# Patient Record
Sex: FEMALE | Race: BLACK OR AFRICAN AMERICAN | NOT HISPANIC OR LATINO | Employment: UNEMPLOYED | URBAN - METROPOLITAN AREA
[De-identification: names, ages, dates, MRNs, and addresses within clinical notes are randomized per-mention and may not be internally consistent; named-entity substitution may affect disease eponyms.]

---

## 2017-05-16 ENCOUNTER — TELEPHONE (OUTPATIENT)
Dept: INTERNAL MEDICINE | Facility: CLINIC | Age: 37
End: 2017-05-16

## 2017-05-17 NOTE — TELEPHONE ENCOUNTER
----- Message from Denisse Robert sent at 5/16/2017  1:20 PM CDT -----  Contact: [phone]/ self   Patient is itching all over and wants to know if something can be called in. It started last night and she hasn't been able to sleep. She has no hives or bumps. Please advise.

## 2018-05-18 ENCOUNTER — OFFICE VISIT (OUTPATIENT)
Dept: URGENT CARE | Facility: CLINIC | Age: 38
End: 2018-05-18
Payer: MEDICAID

## 2018-05-18 VITALS
SYSTOLIC BLOOD PRESSURE: 176 MMHG | BODY MASS INDEX: 32.18 KG/M2 | HEART RATE: 98 BPM | WEIGHT: 205 LBS | OXYGEN SATURATION: 99 % | HEIGHT: 67 IN | DIASTOLIC BLOOD PRESSURE: 98 MMHG | TEMPERATURE: 98 F | RESPIRATION RATE: 14 BRPM

## 2018-05-18 DIAGNOSIS — S05.02XA ABRASION OF LEFT CORNEA, INITIAL ENCOUNTER: ICD-10-CM

## 2018-05-18 DIAGNOSIS — H11.422: ICD-10-CM

## 2018-05-18 DIAGNOSIS — H11.32 SUBCONJUNCTIVAL BLEED, LEFT: Primary | ICD-10-CM

## 2018-05-18 PROCEDURE — 99203 OFFICE O/P NEW LOW 30 MIN: CPT | Mod: S$GLB,,, | Performed by: NURSE PRACTITIONER

## 2018-05-18 RX ORDER — KETOROLAC TROMETHAMINE 4 MG/ML
1 SOLUTION/ DROPS OPHTHALMIC 4 TIMES DAILY
Qty: 5 ML | Refills: 0 | Status: SHIPPED | OUTPATIENT
Start: 2018-05-18 | End: 2018-05-18 | Stop reason: CLARIF

## 2018-05-18 RX ORDER — KETOROLAC TROMETHAMINE 5 MG/ML
1 SOLUTION OPHTHALMIC 4 TIMES DAILY
Qty: 3 ML | Refills: 0 | Status: SHIPPED | OUTPATIENT
Start: 2018-05-18 | End: 2018-05-18 | Stop reason: CLARIF

## 2018-05-18 RX ORDER — POLYMYXIN B SULFATE AND TRIMETHOPRIM 1; 10000 MG/ML; [USP'U]/ML
SOLUTION OPHTHALMIC
Qty: 1 BOTTLE | Refills: 0 | Status: SHIPPED | OUTPATIENT
Start: 2018-05-18 | End: 2018-05-21

## 2018-05-18 NOTE — PATIENT INSTRUCTIONS
Corneal Abrasion    You have received a scratch or scrape (abrasion) to your cornea. The cornea is the clear part in the front of the eye. This sensitive area is very painful when injured. You may make tears frequently, and your vision may be blurry until the injury heals. You may be sensitive to light.  This part of the body heals quickly. You can expect the pain to go away within 24 to 48 hours. If the abrasion is large or deep, your doctor may apply an eye patch, although this is not always done. An antibiotic ointment or eye drops may also be used to prevent infection.  Numbing drops may be used to relieve the pain temporarily so that your eyes can be examined. However, these drops cannot be prescribed for home use because that would prevent healing and lead to more serious problems. Also, if you cant feel your eye, there is a chance of accidentally injuring it further without knowing it.  Home care  · A cold pack (ice in a plastic bag, wrapped in a towel) may be applied over the eye (or eye patch) for 20 minutes at a time, to reduce pain.  · You may use acetaminophen or ibuprofen to control pain, unless another pain medicine was prescribed. Note: If you have chronic liver or kidney disease or ever had a stomach ulcer or GI bleeding, talk with your doctor before using these medicines.  · Rest your eyes and do not read until symptoms are gone.  · If you use contact lenses, do not wear them until all symptoms are gone.  · If your vision is affected by the corneal abrasion or if an eye patch was applied, do not drive a motor vehicle or operate machinery until all symptoms are gone. You may have trouble judging distances using only one eye.  · If your eyes are sensitive to light, try wearing sunglasses, or stay indoors until symptoms go away.  Follow-up care  Follow up with your health care provider, or as advised.  · If no patch was put on your eye, and used but the pain continues for more than 48 hours, you  should have another exam. Return to this facility or contact your health care provider to arrange this.  · If your eye was patched and you were asked to remove the patch yourself, see your health care provider. You may also return to this facility if you still have pain after the patch is removed.  · If you were given a return appointment for patch removal and re-examination, be sure to keep the appointment. Leaving the patch in place longer than advised could be harmful.  When to seek medical advice  Call your health care provider right away if any of these occur.  · Increasing eye pain or pain that does not improve after 24 hours  · Discharge from the eye  · Increasing redness of the eye or swelling of the eyelids  · Worsening vision  · Symptoms that worsen after the abrasion has healed  Date Last Reviewed: 6/14/2015  © 3108-3721 [employer]. [address] [place] [address]. All rights reserved. This information is not intended as a substitute for professional medical care. Always follow your healthcare professional's instructions.        Corneal Injury    An eye injury can hurt your cornea. Your cornea is the clear layer on the front of your eye. It protects your eye from dust and germs, and helps filter out harmful UV (ultraviolet) rays. The cornea also helps to focus light entering your eye. Your cornea is made of strong proteins, but it can be damaged. A slight cut or scratch (abrasion) to the cornea can be very painful. But that is often minor and can heal within 1 or 2 days. A bad abrasion or a hole (puncture) in the cornea can be very serious. These are medical emergencies.  Something in your eye  If you think you have something small in your eye, flush it with water right away. Pull your upper lid out and over your bottom lid. This will help increase the flow of tears across your eye. If these methods dont work, call your healthcare provider. Never try to remove an object from your  eye that doesnt flush out easily with water. Doing so may cause more damage.  When to go to the emergency room (ER)  Call 911 or your local emergency number if you have:  · Severe eye pain  · A puncture injury or bad abrasion  · Something in your eye that you cant flush out with water  · A very swollen or painful eye after removing an object  · A chemical burn  · An object embedded in your eye. (Cover both eyes with a sterile compress and keep both eyes closed while you wait for help. DO NOT put any pressure on your eyes.)  What to expect in the ER  For minor abrasions   Minor abrasions are usually treated with eye drops or ointment. You may be given antibiotics to prevent infection. Most abrasions heal in 1 or 2 days. To help rule out more serious injuries, you may have tests including:  · A standard eye exam to check how well you can see  · A Watson test, which uses a special dye to look for severe eye damage  Depending on the results of these tests, you may be referred to an eye specialist (ophthalmologist).  For serious abrasions or punctures  You will be referred directly to an ophthalmologist for emergency treatment. An eye specialist is needed to reduce further damage and possible vision loss.  Date Last Reviewed: 8/12/2015  © 4350-5656 [employer]. [address] [place] [address], [place] [address]. All rights reserved. This information is not intended as a substitute for professional medical care. Always follow your healthcare professional's instructions.        Subconjunctival Hemorrhage    A subconjunctival hemorrhage is a result of a broken blood vessel in the white portion of the eye. It is usually painless and may be caused by coughing, sneezing, or vomiting. An injury to the eye can cause this. It can also be a sign of hypertension (high blood pressure) or a bleeding disorder.  Although it can look frightening, the presence of the blood is not serious. The blood will be reabsorbed without  treatment within 2 to 3 weeks.  Home care  You may continue your usual activities.  Follow-up care  Follow up with your healthcare provider, or as advised.  When to seek medical advice  Contact your healthcare provider right away if any of these occur:  · Pain in the eye  · Change in vision  · The blood does not disappear within three weeks  · Increasing redness or swelling of the eye  · Severe headache or dizziness  · Signs of bruising or bleeding from other parts of your body  Date Last Reviewed: 6/14/2015  © 8197-1009 [employer]. [address] [place] [address]. All rights reserved. This information is not intended as a substitute for professional medical care. Always follow your healthcare professional's instructions.      -As we discussed today you need to start the eye drops to the affected eye 3 times a day for the next 3-4 days.  -I have given you an [facility] doctor referral. If you don't hear from them in a few days call [phone]  -I have put in a referral for Opthalmology you should be contacted today with a possible appointment.  -Try to see your Eye doctor today if your can.    -If your symptoms worsen and your start to experience worsening pain or worsening changes in vision go to the ER immediately.
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
## 2018-05-22 ENCOUNTER — TELEPHONE (OUTPATIENT)
Dept: URGENT CARE | Facility: CLINIC | Age: 38
End: 2018-05-22

## 2018-11-01 ENCOUNTER — OFFICE VISIT (OUTPATIENT)
Dept: URGENT CARE | Facility: CLINIC | Age: 38
End: 2018-11-01
Payer: MEDICAID

## 2018-11-01 VITALS
SYSTOLIC BLOOD PRESSURE: 148 MMHG | BODY MASS INDEX: 31.39 KG/M2 | DIASTOLIC BLOOD PRESSURE: 76 MMHG | RESPIRATION RATE: 18 BRPM | WEIGHT: 200 LBS | HEART RATE: 81 BPM | TEMPERATURE: 98 F | OXYGEN SATURATION: 98 % | HEIGHT: 67 IN

## 2018-11-01 DIAGNOSIS — R35.0 FREQUENCY OF URINATION: Primary | ICD-10-CM

## 2018-11-01 DIAGNOSIS — M54.9 BACK PAIN WITHOUT RADIATION: ICD-10-CM

## 2018-11-01 DIAGNOSIS — R39.15 URGENCY OF URINATION: ICD-10-CM

## 2018-11-01 LAB
B-HCG UR QL: NEGATIVE
BILIRUB UR QL STRIP: NEGATIVE
CTP QC/QA: YES
GLUCOSE UR QL STRIP: NEGATIVE
KETONES UR QL STRIP: NEGATIVE
LEUKOCYTE ESTERASE UR QL STRIP: NEGATIVE
PH, POC UA: 6 (ref 5–8)
POC BLOOD, URINE: NEGATIVE
POC NITRATES, URINE: NEGATIVE
PROT UR QL STRIP: NEGATIVE
SP GR UR STRIP: 1.02 (ref 1–1.03)
UROBILINOGEN UR STRIP-ACNC: NORMAL (ref 0.1–1.1)

## 2018-11-01 PROCEDURE — 81003 URINALYSIS AUTO W/O SCOPE: CPT | Mod: QW,S$GLB,, | Performed by: FAMILY MEDICINE

## 2018-11-01 PROCEDURE — 81025 URINE PREGNANCY TEST: CPT | Mod: S$GLB,,, | Performed by: FAMILY MEDICINE

## 2018-11-01 PROCEDURE — 99214 OFFICE O/P EST MOD 30 MIN: CPT | Mod: 25,S$GLB,, | Performed by: FAMILY MEDICINE

## 2018-11-01 RX ORDER — DICLOFENAC SODIUM 75 MG/1
75 TABLET, DELAYED RELEASE ORAL 2 TIMES DAILY WITH MEALS
Qty: 60 TABLET | Refills: 2 | Status: SHIPPED | OUTPATIENT
Start: 2018-11-01 | End: 2019-11-01

## 2018-11-01 RX ORDER — DEXTROAMPHETAMINE SACCHARATE, AMPHETAMINE ASPARTATE MONOHYDRATE, DEXTROAMPHETAMINE SULFATE AND AMPHETAMINE SULFATE 7.5; 7.5; 7.5; 7.5 MG/1; MG/1; MG/1; MG/1
30 CAPSULE, EXTENDED RELEASE ORAL EVERY MORNING
COMMUNITY

## 2018-11-01 RX ORDER — DEXAMETHASONE SODIUM PHOSPHATE 100 MG/10ML
10 INJECTION INTRAMUSCULAR; INTRAVENOUS
Status: COMPLETED | OUTPATIENT
Start: 2018-11-01 | End: 2018-11-01

## 2018-11-01 RX ORDER — NAPROXEN 500 MG/1
500 TABLET ORAL 2 TIMES DAILY WITH MEALS
Qty: 30 TABLET | Refills: 2 | Status: SHIPPED | OUTPATIENT
Start: 2018-11-01 | End: 2018-11-01 | Stop reason: SDUPTHER

## 2018-11-01 RX ADMIN — DEXAMETHASONE SODIUM PHOSPHATE 10 MG: 100 INJECTION INTRAMUSCULAR; INTRAVENOUS at 08:11

## 2018-11-02 NOTE — PROGRESS NOTES
"Subjective:       Patient ID: Ryanne Damian is a 38 y.o. female.    Vitals:  height is 5' 7" (1.702 m) and weight is 90.7 kg (200 lb). Her oral temperature is 98.4 °F (36.9 °C). Her blood pressure is 148/76 (abnormal) and her pulse is 81. Her respiration is 18 and oxygen saturation is 98%.     Chief Complaint: Back Pain (1.5 weeks)    37 y/o female with c/o LBP x 2 weeks, minimal radiation, also c/o some dysuria and hesitancy. No fever, no hx of LBP          Back Pain   This is a new problem. The current episode started in the past 7 days. The problem occurs constantly. The problem has been gradually worsening since onset. The quality of the pain is described as aching. The pain does not radiate. The pain is at a severity of 8/10. The pain is moderate. The pain is the same all the time. The symptoms are aggravated by bending, lying down, sitting, twisting, standing, position and urinating. Pertinent negatives include no abdominal pain, bladder incontinence, bowel incontinence, dysuria or numbness. She has tried nothing for the symptoms.     Review of Systems   Constitution: Negative for malaise/fatigue.   Skin: Negative for rash.   Musculoskeletal: Positive for back pain. Negative for muscle cramps, muscle weakness and stiffness.   Gastrointestinal: Negative for abdominal pain and bowel incontinence.   Genitourinary: Positive for frequency, hesitancy and urgency. Negative for bladder incontinence, dysuria and hematuria.   Neurological: Negative for disturbances in coordination and numbness.       Objective:      Physical Exam   Constitutional: She is oriented to person, place, and time. She appears well-developed and well-nourished. She is cooperative.  Non-toxic appearance. She does not appear ill. No distress.   HENT:   Head: Normocephalic and atraumatic.   Right Ear: Hearing, tympanic membrane, external ear and ear canal normal.   Left Ear: Hearing, tympanic membrane, external ear and ear canal normal. "   Nose: Nose normal. No mucosal edema, rhinorrhea or nasal deformity. No epistaxis. Right sinus exhibits no maxillary sinus tenderness and no frontal sinus tenderness. Left sinus exhibits no maxillary sinus tenderness and no frontal sinus tenderness.   Mouth/Throat: Uvula is midline, oropharynx is clear and moist and mucous membranes are normal. No trismus in the jaw. Normal dentition. No uvula swelling. No posterior oropharyngeal erythema.   Eyes: Conjunctivae and lids are normal. Right eye exhibits no discharge. Left eye exhibits no discharge.   Sclera clear bilat   Neck: Trachea normal, normal range of motion, full passive range of motion without pain and phonation normal. Neck supple. No JVD present. No tracheal deviation present.   Cardiovascular: Normal rate, regular rhythm, normal heart sounds, intact distal pulses and normal pulses. Exam reveals no friction rub.   No murmur heard.  Pulmonary/Chest: Effort normal and breath sounds normal. No stridor. She has no wheezes.   Abdominal: Soft. Normal appearance and bowel sounds are normal. She exhibits no distension, no pulsatile midline mass and no mass. There is no tenderness.   Musculoskeletal: Normal range of motion. She exhibits no edema or deformity.   BACK: minimal paraspinous tenderness  Flexion/extension normal  SLRE negative  No cva tenderness   Lymphadenopathy:     She has no cervical adenopathy.   Neurological: She is alert and oriented to person, place, and time. She exhibits normal muscle tone. Coordination normal.   Skin: Skin is warm, dry and intact. She is not diaphoretic. No pallor.   Psychiatric: She has a normal mood and affect. Her speech is normal and behavior is normal. Judgment and thought content normal. Cognition and memory are normal.   Nursing note and vitals reviewed.      Assessment:       1. Frequency of urination    2. Urgency of urination    3. Back pain without radiation        Plan:         Frequency of urination  -     POCT  urine pregnancy    Urgency of urination  -     POCT Urinalysis, Dipstick, Manual, W/O Scope    Back pain without radiation  -     naproxen (NAPROSYN) 500 MG tablet; Take 1 tablet (500 mg total) by mouth 2 (two) times daily with meals.  Dispense: 30 tablet; Refill: 2

## 2018-11-02 NOTE — PATIENT INSTRUCTIONS
Back Pain (Acute or Chronic)    Back pain is one of the most common problems. The good news is that most people feel better in 1 to 2 weeks, and most of the rest in 1 to 2 months. Most people can remain active.  People experience and describe pain differently; not everyone is the same.  · The pain can be sharp, stabbing, shooting, aching, cramping or burning.  · Movement, standing, bending, lifting, sitting, or walking may worsen pain.  · It can be localized to one spot or area, or it can be more generalized.  · It can spread or radiate upwards, to the front, or go down your arms or legs (sciatica).  · It can cause muscle spasm.  Most of the time, mechanical problems with the muscles or spine cause the pain. Mechanical problems are usually caused by an injury to the muscles or ligaments. While illness can cause back pain, it is usually not caused by a serious illness. Mechanical problems include:   · Physical activity such as sports, exercise, work, or normal activity  · Overexertion, lifting, pushing, pulling incorrectly or too aggressively  · Sudden twisting, bending, or stretching from an accident, or accidental movement  · Poor posture  · Stretching or moving wrong, without noticing pain at the time  · Poor coordination, lack of regular exercise (check with your doctor about this)  · Spinal disc disease or arthritis  · Stress  Pain can also be related to pregnancy, or illness like appendicitis, bladder or kidney infections, pelvic infections, and many other things.  Acute back pain usually gets better in 1 to 2 weeks. Back pain related to disk disease, arthritis in the spinal joints or spinal stenosis (narrowing of the spinal canal) can become chronic and last for months or years.  Unless you had a physical injury (for example, a car accident or fall) X-rays are usually not needed for the initial evaluation of back pain. If pain continues and does not respond to medical treatment, X-rays and other tests may be  needed.  Home care  Try these home care recommendations:  · When in bed, try to find a position of comfort. A firm mattress is best. Try lying flat on your back with pillows under your knees. You can also try lying on your side with your knees bent up towards your chest and a pillow between your knees.  · At first, do not try to stretch out the sore spots. If there is a strain, it is not like the good soreness you get after exercising without an injury. In this case, stretching may make it worse.  · Avoid prolong sitting, long car rides, or travel. This puts more stress on the lower back than standing or walking.  · During the first 24 to 72 hours after an acute injury or flare up of chronic back pain, apply an ice pack to the painful area for 20 minutes and then remove it for 20 minutes. Do this over a period of 60 to 90 minutes or several times a day. This will reduce swelling and pain. Wrap the ice pack in a thin towel or plastic to protect your skin.  · You can start with ice, then switch to heat. Heat (hot shower, hot bath, or heating pad) reduces pain and works well for muscle spasms. Heat can be applied to the painful area for 20 minutes then remove it for 20 minutes. Do this over a period of 60 to 90 minutes or several times a day. Do not sleep on a heating pad. It can lead to skin burns or tissue damage.  · You can alternate ice and heat therapy. Talk with your doctor about the best treatment for your back pain.  · Therapeutic massage can help relax the back muscles without stretching them.  · Be aware of safe lifting methods and do not lift anything without stretching first.  Medicines  Talk to your doctor before using medicine, especially if you have other medical problems or are taking other medicines.  · You may use over-the-counter medicine as directed on the bottle to control pain, unless another pain medicine was prescribed. If you have chronic conditions like diabetes, liver or kidney disease,  stomach ulcers, or gastrointestinal bleeding, or are taking blood thinners, talk to your doctor before taking any medicine.  · Be careful if you are given a prescription medicines, narcotics, or medicine for muscle spasms. They can cause drowsiness, affect your coordination, reflexes, and judgement. Do not drive or operate heavy machinery.  Follow-up care  Follow up with your healthcare provider, or as advised.   A radiologist will review any X-rays that were taken. Your provide will notify you of any new findings that may affect your care.  Call 911  Call emergency services if any of the following occur:  · Trouble breathing  · Confusion  · Very drowsy or trouble awakening  · Fainting or loss of consciousness  · Rapid or very slow heart rate  · Loss of bowel or bladder control  When to seek medical advice  Call your healthcare provider right away if any of these occur:   · Pain becomes worse or spreads to your legs  · Weakness or numbness in one or both legs  · Numbness in the groin or genital area  Date Last Reviewed: 7/1/2016  © 4757-1248 [employer], [employer]. [address] [place] [address], [place] [address]. All rights reserved. This information is not intended as a substitute for professional medical care. Always follow your healthcare professional's instructions.

## 2019-01-31 ENCOUNTER — TELEPHONE (OUTPATIENT)
Dept: FAMILY MEDICINE | Facility: HOSPITAL | Age: 39
End: 2019-01-31

## 2019-01-31 NOTE — TELEPHONE ENCOUNTER
Attempted to contact patient and discuss, but no answer. Left voicemail stating to call the office and ask to speak to the nurse Long.

## 2019-01-31 NOTE — TELEPHONE ENCOUNTER
----- Message from Perri Shah sent at 1/31/2019 12:49 PM CST -----  Patient asking if Dr can send her a RX for pain. Explained that she has not been seen by her and she needs to come in. Pt insisted I sent a message

## 2019-02-01 ENCOUNTER — HOSPITAL ENCOUNTER (EMERGENCY)
Facility: HOSPITAL | Age: 39
Discharge: HOME | End: 2019-02-01
Attending: EMERGENCY MEDICINE
Payer: MEDICAID

## 2019-02-01 VITALS
WEIGHT: 207 LBS | HEIGHT: 67 IN | TEMPERATURE: 98 F | HEART RATE: 107 BPM | RESPIRATION RATE: 16 BRPM | BODY MASS INDEX: 32.49 KG/M2 | SYSTOLIC BLOOD PRESSURE: 183 MMHG | DIASTOLIC BLOOD PRESSURE: 92 MMHG | OXYGEN SATURATION: 97 %

## 2019-02-01 DIAGNOSIS — R03.0 ELEVATED BLOOD PRESSURE READING WITHOUT DIAGNOSIS OF HYPERTENSION: ICD-10-CM

## 2019-02-01 DIAGNOSIS — M54.5 ACUTE LOW BACK PAIN, UNSPECIFIED BACK PAIN LATERALITY, WITH SCIATICA PRESENCE UNSPECIFIED: Primary | ICD-10-CM

## 2019-02-01 LAB
B-HCG UR QL: NEGATIVE
CTP QC/QA: YES

## 2019-02-01 PROCEDURE — 99284 EMERGENCY DEPT VISIT MOD MDM: CPT

## 2019-02-01 PROCEDURE — 81025 URINE PREGNANCY TEST: CPT | Performed by: NURSE PRACTITIONER

## 2019-02-01 RX ORDER — CYCLOBENZAPRINE HCL 10 MG
10 TABLET ORAL 3 TIMES DAILY PRN
Qty: 15 TABLET | Refills: 0 | Status: SHIPPED | OUTPATIENT
Start: 2019-02-01 | End: 2019-02-06

## 2019-02-01 RX ORDER — KETOROLAC TROMETHAMINE 10 MG/1
10 TABLET, FILM COATED ORAL
Qty: 14 TABLET | Refills: 0 | Status: SHIPPED | OUTPATIENT
Start: 2019-02-01 | End: 2020-06-11

## 2019-02-01 NOTE — ED PROVIDER NOTES
Encounter Date: 2/1/2019       History     Chief Complaint   Patient presents with    Back Pain     exacerbation of chronic lower back pain after cleaning house and moving furniture earlier today     Patient is a 39-year female past medical history of ADHD and depression who presents to ED for evaluation of low back pain since last night.  Patient reports that she was cleaning her house and moving furniture all day and her back started hurting afterwards.  Patient reports previous back injury few years ago, went to urgent care, diagnosed muscle strain, and placed on anti-inflammatories and muscle relaxers.  Patient reports taking ibuprofen with no improvement.  Patient reports the pain is worse with movement.  Denies any alleviating factors.  Patient denies any recent trauma, fever, urinary symptoms, incontinence, numbness, tingling, or any other concerns.      The history is provided by the patient.     Review of patient's allergies indicates:   Allergen Reactions    Codeine Hives     Rash, nausea     Past Medical History:   Diagnosis Date    ADHD (attention deficit hyperactivity disorder)     Depression      No past surgical history on file.  Family History   Problem Relation Age of Onset    ADD / ADHD Neg Hx     Alcohol abuse Neg Hx     Anxiety disorder Neg Hx     Bipolar disorder Neg Hx     Dementia Neg Hx     Depression Neg Hx     Drug abuse Neg Hx     OCD Neg Hx     Paranoid behavior Neg Hx     Physical abuse Neg Hx     Schizophrenia Neg Hx     Seizures Neg Hx     Self injury Neg Hx     Sexual abuse Neg Hx     Suicide Neg Hx      Social History     Tobacco Use    Smoking status: Current Every Day Smoker     Packs/day: 0.25     Years: 0.50     Pack years: 0.12    Smokeless tobacco: Never Used   Substance Use Topics    Alcohol use: Yes     Comment: socially    Drug use: No     Review of Systems   Constitutional: Negative for chills and fever.   Respiratory: Negative for shortness of  breath.    Cardiovascular: Negative for chest pain.   Genitourinary: Negative for dysuria.        Denies bowel/bladder incontinence.    Musculoskeletal: Positive for back pain. Negative for gait problem, neck pain and neck stiffness.   Skin: Negative for rash.   Neurological: Negative for numbness.   Hematological: Does not bruise/bleed easily.   All other systems reviewed and are negative.      Physical Exam     Initial Vitals [02/01/19 0206]   BP Pulse Resp Temp SpO2   (!) 181/104 102 20 98.9 °F (37.2 °C) 100 %      MAP       --         Physical Exam    Vitals reviewed.  Constitutional: She appears well-developed and well-nourished.  Non-toxic appearance. She does not have a sickly appearance.   HENT:   Head: Atraumatic.   Mouth/Throat: Oropharynx is clear and moist.   Eyes: EOM are normal.   Neck: Normal range of motion, full passive range of motion without pain and phonation normal. Neck supple.   Cardiovascular: Regular rhythm. Tachycardia present.    Asymptomatic hypertension.   Pulmonary/Chest: No respiratory distress.   Musculoskeletal:        Lumbar back: She exhibits tenderness, bony tenderness and pain. She exhibits normal range of motion, no swelling, no edema, no deformity, no laceration and no spasm.        Back:    No saddle anesthesia.     Neurological: She has normal strength. No sensory deficit. Gait normal.   Sensation intact.   Skin: Skin is warm.   Psychiatric: She has a normal mood and affect.         ED Course   Procedures  Labs Reviewed   POCT URINE PREGNANCY          Imaging Results    None          Medical Decision Making:   History:   Old Medical Records: I decided to obtain old medical records.  Initial Assessment:   Pt presents to ED with low back pain after cleaning and moving furniture all day.  Appears well, non-toxic. Afebrile. 2+ DP pulses bilaterally by palpation. Sensation and strength intact bilaterally in lower extremities. Pt NVI.  Steady gait.  No s/s of cauda equina.   ED  Management:  POCT pregnancy   Other:   I discussed test(s) with the performing physician.       <> Summary of the Findings: Care this patient discussed with Dr. Bocanegra who agrees with ED course and disposition.  Patient reports that she has a Mirena and decline pregnancy test.  No red flags on presentation or physical exam. Unlikely to be spinal stenosis as there are no neurologic findings, does not appear to be infectious given no fever, no point tenderness, coulg be DJD or disc herniation but xray normal and no red flags that would prompt any imaging. Likely musculoskeletal pain. I will d/c home with anti-inflammatories and muscle relaxer. Will instruct pt to follow up with PCP in 1-2 days if symptoms do not improve for recheck of blood pressure.  We disscused at length warning signs for return including but not limited to increased pain, change in gait, sensation changes around the rectum or perineum, bowel or bladder issues, fever and the pt understands. The pt is comfortable going home at this time. After taking into careful account the historical factors and physical exam findings of the patient's presentation today, in conjunction with the empirical and objective data obtained on ED workup, no acute emergent medical condition requiring admission has been identified. The patient appears to be low risk for an emergent medical condition and I feel it is safe and appropriate at this time for the patient to be discharged to follow-up as detailed in their discharge instructions for reevaluation and possible continued outpatient workup and management. Discharge and follow-up instructions discussed with the patient who expressed understanding and willingness to comply with my recommendations.     This medical record was prepared using voice dictation software. There may be phonetic errors.                      Clinical Impression:   The primary encounter diagnosis was Acute back pain, unspecified back location,  unspecified back pain laterality. A diagnosis of Elevated blood pressure reading without diagnosis of hypertension was also pertinent to this visit.                             Jesse Osman NP  02/01/19 [id number]

## 2019-02-01 NOTE — ED NOTES
Pt.  Has had chronic low back pain that has been exacerbated by moderate activity earlier today (cleaning out closets and moving some furniture). Denies urinary symptoms. Gait with slight limp.

## 2019-02-01 NOTE — DISCHARGE INSTRUCTIONS
Take prescribed medications as labeled as needed for pain. Do not drive, drink alcohol, or operate machinery while taking Flexeril.  Follow up with PCP in 1-2 days for follow-up appointment and to get blood pressure rechecked.  Return to ED for any concerns or worsening symptoms.

## 2019-02-07 ENCOUNTER — OFFICE VISIT (OUTPATIENT)
Dept: FAMILY MEDICINE | Facility: HOSPITAL | Age: 39
End: 2019-02-07
Attending: FAMILY MEDICINE
Payer: MEDICAID

## 2019-02-07 VITALS
DIASTOLIC BLOOD PRESSURE: 96 MMHG | SYSTOLIC BLOOD PRESSURE: 160 MMHG | HEIGHT: 68 IN | WEIGHT: 220.44 LBS | HEART RATE: 79 BPM | BODY MASS INDEX: 33.41 KG/M2

## 2019-02-07 DIAGNOSIS — L81.9 HYPERPIGMENTATION OF SKIN: ICD-10-CM

## 2019-02-07 DIAGNOSIS — R63.1 POLYDIPSIA: ICD-10-CM

## 2019-02-07 DIAGNOSIS — R53.83 FATIGUE, UNSPECIFIED TYPE: ICD-10-CM

## 2019-02-07 DIAGNOSIS — I10 ESSENTIAL HYPERTENSION: Primary | ICD-10-CM

## 2019-02-07 DIAGNOSIS — E66.9 OBESITY (BMI 30.0-34.9): ICD-10-CM

## 2019-02-07 PROCEDURE — 99215 OFFICE O/P EST HI 40 MIN: CPT | Performed by: FAMILY MEDICINE

## 2019-02-07 RX ORDER — NAPROXEN 500 MG/1
500 TABLET ORAL 2 TIMES DAILY WITH MEALS
Refills: 2 | COMMUNITY
Start: 2018-12-05 | End: 2020-06-11

## 2019-02-07 RX ORDER — CLOBETASOL PROPIONATE 0.46 MG/ML
SOLUTION TOPICAL
Refills: 2 | COMMUNITY
Start: 2018-12-08

## 2019-02-07 RX ORDER — TRAZODONE HYDROCHLORIDE 100 MG/1
100 TABLET ORAL NIGHTLY PRN
Refills: 1 | COMMUNITY
Start: 2018-11-12 | End: 2020-06-11

## 2019-02-07 RX ORDER — KETOCONAZOLE 20 MG/ML
SHAMPOO, SUSPENSION TOPICAL
Refills: 0 | COMMUNITY
Start: 2018-12-08

## 2019-02-07 RX ORDER — AMLODIPINE BESYLATE 5 MG/1
5 TABLET ORAL DAILY
Qty: 30 TABLET | Refills: 11 | Status: SHIPPED | OUTPATIENT
Start: 2019-02-07 | End: 2020-06-11

## 2019-02-07 NOTE — PROGRESS NOTES
Subjective:       Patient ID: Ryanne Damian is a 39 y.o. female.    Chief Complaint: Blood pressure check    HPI Pt is 38 yo F here for blood pressure check. She reports she was never formally diagnosed with HTN or prescribed medications but has had elevated blood pressure for years. She expressed desire to lose weight and does admit to unhealthy diet including sugar sweetened beverages. Also reports feeling tired all the time. Sleeps 5-6 hrs per night and naps during the day. She denies heavy menes.    PSHx: C/S x1  PMHx: None  FHx: HTN, DM2, no CAD  SHx: smokes cigs 3-7 per day x 5 years, drinks alcohol on weekends 1-2 diaquiris or 1-2 glasses of wine. Denies illicit drug use  Meds: Diclofenac and Flexeril  ObGyn: Menses every 28-30 days, lasting 1-3 days, Has Mirena in place for 5 years. Last pap 6 years ago      Review of Systems   Constitutional: Negative for chills and fever.   HENT: Negative for congestion and sore throat.    Eyes: Negative for visual disturbance.   Respiratory: Negative for cough, shortness of breath and wheezing.    Cardiovascular: Negative for chest pain.   Gastrointestinal: Negative for abdominal pain, diarrhea and nausea.   Endocrine: Negative for polydipsia and polyuria.   Musculoskeletal: Negative for arthralgias.   Skin: Negative for rash.        Dark spots   Neurological: Negative for dizziness and headaches.   Psychiatric/Behavioral: Negative for sleep disturbance. The patient is not nervous/anxious.        Objective:      Vitals:    02/07/19 1518   BP: (!) 160/96   Pulse: 79     Physical Exam   Constitutional: She is oriented to person, place, and time. She appears well-developed and well-nourished.   obese   HENT:   Head: Normocephalic and atraumatic.   Eyes: EOM are normal.   Neck: Normal range of motion.   Cardiovascular: Normal rate, regular rhythm and normal heart sounds. Exam reveals no gallop and no friction rub.   No murmur heard.  Pulmonary/Chest: Effort normal and  breath sounds normal. No respiratory distress.   Abdominal: Soft.   Musculoskeletal: Normal range of motion.   Neurological: She is alert and oriented to person, place, and time.   Skin: Skin is warm.   Areas of facial hyperpigmentation   Psychiatric: She has a normal mood and affect. Her behavior is normal. Judgment and thought content normal.       Assessment:       1. Essential hypertension    2. Polydipsia    3. Fatigue, unspecified type    4. Hyperpigmentation of skin    5. Obesity (BMI 30.0-34.9)        Plan:       Essential hypertension  -     Comprehensive metabolic panel; Future; Expected date: 02/07/2019  -     amLODIPine (NORVASC) 5 MG tablet; Take 1 tablet (5 mg total) by mouth once daily.  Dispense: 30 tablet; Refill: 11    Polydipsia  -     Hemoglobin A1c; Future; Expected date: 02/07/2019    Fatigue, unspecified type  -     CBC auto differential; Future; Expected date: 02/07/2019  -     Iron and TIBC; Future; Expected date: 02/07/2019  -     Ferritin; Future; Expected date: 02/07/2019  -     TSH ordered    Hyperpigmentation of skin  -     Ambulatory referral to Dermatology    Obesity (BMI 30.0-34.9)  -     Ambulatory referral to Nutrition Services  -     Also counseled on importance of exercise       Follow-up in about 4 weeks (around 3/7/2019). bp recheck, benjamin Stern MD  2/7/2019  [facility]

## 2019-02-25 ENCOUNTER — TELEPHONE (OUTPATIENT)
Dept: FAMILY MEDICINE | Facility: HOSPITAL | Age: 39
End: 2019-02-25

## 2019-02-25 NOTE — TELEPHONE ENCOUNTER
----- Message from Perri Shah sent at 2/25/2019  9:34 AM CST -----  Pt needs to talk to Dr to see if she can get something for her stuffy nose.

## 2019-02-25 NOTE — TELEPHONE ENCOUNTER
I returned patient's call and notified her that she needed an appointment for anything to be prescribed. She said she believes it's a sinus problem and asked what she could take OTC. I told her she could try Claritin, Zyrtec, or   Flonase. I also informed her that she can call in the morning at 8am and request a same day appointment if she feels she needs to see a provider.

## 2019-04-10 NOTE — PROGRESS NOTES
I have reviewed the notes, assessments, and/or procedures performed, I concur with her/his documentation of Ryanne Damian.

## 2020-06-11 ENCOUNTER — OFFICE VISIT (OUTPATIENT)
Dept: FAMILY MEDICINE | Facility: HOSPITAL | Age: 40
End: 2020-06-11
Attending: FAMILY MEDICINE
Payer: MEDICAID

## 2020-06-11 VITALS
WEIGHT: 223.56 LBS | DIASTOLIC BLOOD PRESSURE: 110 MMHG | HEART RATE: 98 BPM | BODY MASS INDEX: 35.09 KG/M2 | SYSTOLIC BLOOD PRESSURE: 164 MMHG | HEIGHT: 67 IN

## 2020-06-11 DIAGNOSIS — I10 ESSENTIAL HYPERTENSION: Primary | ICD-10-CM

## 2020-06-11 DIAGNOSIS — F41.1 GENERALIZED ANXIETY DISORDER: ICD-10-CM

## 2020-06-11 DIAGNOSIS — M54.50 CHRONIC BILATERAL LOW BACK PAIN WITHOUT SCIATICA: ICD-10-CM

## 2020-06-11 DIAGNOSIS — L21.9 SEBORRHEIC DERMATITIS: ICD-10-CM

## 2020-06-11 DIAGNOSIS — H10.13 ALLERGIC CONJUNCTIVITIS OF BOTH EYES: ICD-10-CM

## 2020-06-11 DIAGNOSIS — G89.29 CHRONIC BILATERAL LOW BACK PAIN WITHOUT SCIATICA: ICD-10-CM

## 2020-06-11 PROCEDURE — 99214 OFFICE O/P EST MOD 30 MIN: CPT | Performed by: STUDENT IN AN ORGANIZED HEALTH CARE EDUCATION/TRAINING PROGRAM

## 2020-06-11 RX ORDER — METRONIDAZOLE 500 MG/1
TABLET ORAL
COMMUNITY

## 2020-06-11 RX ORDER — KETOCONAZOLE 20 MG/ML
SHAMPOO, SUSPENSION TOPICAL
COMMUNITY
End: 2020-06-11 | Stop reason: SDUPTHER

## 2020-06-11 RX ORDER — KETOCONAZOLE 20 MG/G
CREAM TOPICAL
COMMUNITY

## 2020-06-11 RX ORDER — BUPROPION HYDROCHLORIDE 150 MG/1
150 TABLET ORAL DAILY
Qty: 30 TABLET | Refills: 11 | Status: SHIPPED | OUTPATIENT
Start: 2020-06-11 | End: 2021-06-11

## 2020-06-11 RX ORDER — CLOTRIMAZOLE 1 %
CREAM (GRAM) TOPICAL 2 TIMES DAILY
Qty: 24 G | Refills: 0 | Status: SHIPPED | OUTPATIENT
Start: 2020-06-11

## 2020-06-11 RX ORDER — CLOBETASOL PROPIONATE 0.46 MG/ML
SOLUTION TOPICAL
COMMUNITY

## 2020-06-11 RX ORDER — HYDROCORTISONE 25 MG/G
CREAM TOPICAL 2 TIMES DAILY
Qty: 20 G | Refills: 0 | Status: SHIPPED | OUTPATIENT
Start: 2020-06-11

## 2020-06-11 RX ORDER — DICLOFENAC SODIUM 75 MG/1
TABLET, DELAYED RELEASE ORAL
COMMUNITY

## 2020-06-11 RX ORDER — OLOPATADINE HYDROCHLORIDE 2 MG/ML
1 SOLUTION/ DROPS OPHTHALMIC DAILY
Qty: 2 ML | Refills: 1 | Status: SHIPPED | OUTPATIENT
Start: 2020-06-11 | End: 2021-06-11

## 2020-06-11 RX ORDER — TRAMADOL HYDROCHLORIDE 50 MG/1
100 TABLET ORAL
COMMUNITY
Start: 2013-10-04

## 2020-06-11 RX ORDER — IBUPROFEN 600 MG/1
600 TABLET ORAL
COMMUNITY
Start: 2013-10-04

## 2020-06-11 RX ORDER — LOSARTAN POTASSIUM AND HYDROCHLOROTHIAZIDE 12.5; 5 MG/1; MG/1
TABLET ORAL
COMMUNITY

## 2020-06-12 NOTE — PROGRESS NOTES
Subjective:       Patient ID: Ryanne Damian is a 40 y.o. female.    Chief Complaint: Annual Exam and Fatigue    39 yo female with history of HTN, ADHD, Anxiety presenting to -[facility]. Patient states that her blood pressure is elevated because she did not take her medications this morning. Patient reports that she is non complaint with her medications as she does not like how the medication makes her feel. She reports that she has headaches when she takes her medication. Patient is supposed to be on Losaratan-HCTZ. Patient does not drink water throughout the day and drinks caffeine.     Patient is also concern with her lower back pain. Patient reports that she started to have back pain that she attributes to her epidural. Patient has tried Tylenol, Aleve, Ibuprofen and it has not helped her back pain. Patient denies any radiating pain. Patient denies any injury or trauma to her back. Patient has not tried physical therapy.     Patient has 4 daughters with her youngest being twins at 7 years ago.     Patient was on Adderall but her Psychiatrist moves and she has not been able to find another Psychiatrist. Per [place]  Aware, patient last received Adderall in 12/2018.     She reports that she has been feeling down and not wanting to be around other people. Patient has a history of depression and anxiety and has been on Lexapro in the past but does not want to be go back on Lexapro. Patient smokes 1/4 pack per day and would like to quit smoking.     Patient is also concern with her skin. Patient reports that she has dry skin particularly on her face. She has flaking particularly in her eyebrows and it worsens with stress.     Patient states that she received a pap smear last year. Denies any history of abnormal pap.         Review of Systems   Constitutional: Negative for chills and fever.   HENT: Negative for congestion and sore throat.    Eyes: Negative for visual disturbance.   Respiratory: Negative for  cough, shortness of breath and wheezing.    Cardiovascular: Negative for chest pain.   Gastrointestinal: Negative for abdominal pain, diarrhea and nausea.   Endocrine: Negative for polydipsia and polyuria.   Musculoskeletal: Positive for back pain. Negative for arthralgias.   Skin: Negative for rash.        Dark spots   Neurological: Negative for dizziness and headaches.   Psychiatric/Behavioral: Negative for sleep disturbance. The patient is not nervous/anxious.        Objective:      Vitals:    06/11/20 1523   BP: (!) 164/110   Pulse: 98     Physical Exam   Constitutional: She is oriented to person, place, and time. She appears well-developed and well-nourished.   BMI: 35   HENT:   Head: Normocephalic and atraumatic.   Eyes: EOM are normal.   Neck: Normal range of motion.   Cardiovascular: Normal rate, regular rhythm and normal heart sounds. Exam reveals no gallop and no friction rub.   No murmur heard.  Pulmonary/Chest: Effort normal and breath sounds normal. No respiratory distress.   Abdominal: Soft.   Musculoskeletal: Normal range of motion.   Neurological: She is alert and oriented to person, place, and time.   Skin: Skin is warm.   Areas of facial hyperpigmentation   Psychiatric: She has a normal mood and affect. Her behavior is normal. Judgment and thought content normal.   PHQ-9: 25  SHEILA-7: 18       Assessment:       1. Essential hypertension    2. Chronic bilateral low back pain without sciatica    3. Generalized anxiety disorder    4. Allergic conjunctivitis of both eyes    5. Seborrheic dermatitis        Plan:       Essential hypertension  -     Lipid Panel; Future; Expected date: 06/11/2020  -     Hemoglobin A1C; Future; Expected date: 06/11/2020  -     CBC auto differential; Future; Expected date: 06/11/2020  -     TSH; Future; Expected date: 06/11/2020  -     Comprehensive metabolic panel; Future; Expected date: 06/11/2020    Chronic bilateral low back pain without sciatica  -     Ambulatory  referral/consult to Physical/Occupational Therapy; Future; Expected date: 06/18/2020    Generalized anxiety disorder  -     buPROPion (WELLBUTRIN XL) 150 MG TB24 tablet; Take 1 tablet (150 mg total) by mouth once daily.  Dispense: 30 tablet; Refill: 11    Allergic conjunctivitis of both eyes       olopatadine (PATADAY) 0.2 % Drop; Place 1 drop into both eyes once daily.  Dispense: 2 mL; Refill: 1    Seborrheic dermatitis  -     clotrimazole (LOTRIMIN) 1 % cream; Apply topically 2 (two) times daily.  Dispense: 24 g; Refill: 0  -     hydrocortisone 2.5 % cream; Apply topically 2 (two) times daily.  Dispense: 20 g; Refill: 0    Follow up in about 2 weeks (around 6/25/2020). Discussed with patient the importance of being complaint with hypertension medication. As patient has not taken her medication regularly, will not make any changes at this moment. Discussed with patient to also increased her water intake to evaluate if dehydration may be the cause of her headaches. If patient is complaint with medication and remains to have elevated blood pressure and patient does not want to remain on Losartan, can change patient to Procardia at next visit. Discussed the importance of exercise for improvement in mood. Will trial Wellbutrin and evaluate medication in 2 weeks.

## 2020-06-16 ENCOUNTER — TELEPHONE (OUTPATIENT)
Dept: FAMILY MEDICINE | Facility: HOSPITAL | Age: 40
End: 2020-06-16

## 2020-06-16 NOTE — PROGRESS NOTES
I assume primary medical responsibility for this patient. I have reviewed the history, physical, and assessement & treatment plan with the resident and agree that the care is reasonable and necessary. This service has been performed by a resident with the presence of a teaching physician for the key parts of the history/exam. If necessary, an addendum of additional findings or evaluation beyond the resident documentation will be noted below.    Facial rash - suspect combo of allergic conjunctivitis & seb derm  HTN - restart meds    Ilana Jurado MD

## 2020-07-20 ENCOUNTER — TELEPHONE (OUTPATIENT)
Dept: FAMILY MEDICINE | Facility: HOSPITAL | Age: 40
End: 2020-07-20

## 2020-07-20 DIAGNOSIS — L21.9 SEBORRHEIC DERMATITIS: Primary | ICD-10-CM

## 2020-07-20 NOTE — TELEPHONE ENCOUNTER
----- Message from Perri Shah sent at 7/20/2020 10:47 AM CDT -----  Pt still waiting for her dermatology referral. Please call pt when order has been put in. Please call [phone]

## 2020-07-21 NOTE — TELEPHONE ENCOUNTER
Called patient per Dr. Gomez.Patient would like to proceed with PT referral from June. Pateint is complaining of back pain advised to call at 8 a.m for Urgent appointment to be examined. Patient then asked about Dermatology referral and I advised that referral was entered yesterday and it needs authorization from insurance company before it can be scheduled. Patient verbalizes understanding and if she is still in pain will call for appointment in the morning.

## 2020-07-22 DIAGNOSIS — M54.50 CHRONIC BILATERAL LOW BACK PAIN WITHOUT SCIATICA: Primary | ICD-10-CM

## 2020-07-22 DIAGNOSIS — G89.29 CHRONIC BILATERAL LOW BACK PAIN WITHOUT SCIATICA: Primary | ICD-10-CM

## 2020-07-24 ENCOUNTER — CLINICAL SUPPORT (OUTPATIENT)
Dept: REHABILITATION | Facility: HOSPITAL | Age: 40
End: 2020-07-24
Payer: MEDICAID

## 2020-07-24 DIAGNOSIS — M53.86 DECREASED ROM OF LUMBAR SPINE: ICD-10-CM

## 2020-07-24 DIAGNOSIS — G89.29 CHRONIC BILATERAL LOW BACK PAIN WITHOUT SCIATICA: ICD-10-CM

## 2020-07-24 DIAGNOSIS — M54.50 CHRONIC BILATERAL LOW BACK PAIN WITHOUT SCIATICA: ICD-10-CM

## 2020-07-24 DIAGNOSIS — R53.1 DECREASED STRENGTH: ICD-10-CM

## 2020-07-24 PROCEDURE — 97161 PT EVAL LOW COMPLEX 20 MIN: CPT | Mod: PN

## 2020-07-24 NOTE — PLAN OF CARE
[facility]  Physical Therapy Initial Evaluation    Name: Ryanne Damian  Clinic Number: [id number]    Therapy Diagnosis:   Encounter Diagnoses   Name Primary?    Decreased strength     Decreased ROM of lumbar spine     Chronic bilateral low back pain without sciatica      Physician: Janice Gomez MD    Physician Orders: PT Eval and Treat  Medical Diagnosis from Referral: M54.5,G89.29 (ICD-10-CM) - Chronic bilateral low back pain without sciatica  Evaluation Date: 7/24/2020  Authorization Period Expiration: 07/22/2021  Plan of Care Expiration: 9/18/2020  Visit # / Visits authorized: 1/1  FOTO: 1/5  PTA Visit: 0/6    Time In: 11:00  Time Out: 11:30  Total Billable Time: 30 minutes (1 LCE)    Precautions: Standard and ADHD, depression    Subjective   Date of onset: 7/10/2020  History of current condition - Ryanne reports: she started having B low back pain about 6 years ago that started after her epidural x2 while giving birth, and her pain is now episodic. Her pain is usually in the middle of her lower back, and denies any radicular type pains or buttock/hip pain. She feels her pain is getting worse this episode starting about a week ago, but pt was unable to specify the frequency of her episodes. She did have a MRI scheduled by did not go to the appointment. She has increased pain when the weather changes, prolonged standing, walking a block, sitting for longer than 20 minutes, squatting, bending forward, and rolling in bed. She has night pain where she does not sleep at all due to her low back pain. Report she does have some constipation issues and has to strain for bowel movements at times. Denies any numbness or tingling.      Medical History:   Past Medical History:   Diagnosis Date    ADHD (attention deficit hyperactivity disorder)     Depression        Surgical History:   Ryanne Damian  has no past surgical history on file.    Medications:   Ryanne ROTH has a current  medication list which includes the following prescription(s): bupropion, clobetasol, clobetasol, clotrimazole, dextroamphetamine-amphetamine, diclofenac, hydrocortisone, ibuprofen, ketoconazole, ketoconazole, losartan-hydrochlorothiazide 50-12.5 mg, metronidazole, olopatadine, and tramadol.    Allergies:   Review of patient's allergies indicates:   Allergen Reactions    Codeine Hives     Rash, nausea    Hydrocodone         Imaging: See imaging in EMR    Prior Therapy: none for low back  Social History: [place] with stairs  Occupation: not working, in school  Prior Level of Function: still limited in mobility even when her back is not flared up  Current Level of Function Limitations: pain with prolonged standing and walking, bending activities  Pts goals: for my back to feel better and to better my posture    Pain:  Current 9/10, worst 10/10, best 5/10   Location: midline low back L4-L5-S1  Description: Aching, Burning, Throbbing and Grabbing  Aggravating Factors: see limitations noted above  Easing Factors: hot shower, cold pack    Objective     Gait: WNL  Posture: increased lumbar lordosis  Reflexes:    Clonus: negative B   Ledbetter's Test: negative   Babinski Test: not tested  DTR:    Right Left Comment   Patellar (L3-4) 2+ 2+    Achilles (S1) 2+ 2+      Sensation: WNL  Palpation: +TTP at B SI joints and midline low back at L3-L5  Joint mobility: decreased lumbar and L hip ROM. Guarding with LLE PROM    A/PROM and MMT:  * = low back pain with testing  NT = Not tested  AROM: (degrees) LUMBAR   Flexion (40-50) 100%   Extension (15-20) 75%*   Right side bending (~20) 100%   Left side bending  (~20) 100%   Right rotation (5-10) 100%   Left rotation (5-10) 100%     Hip  Right   Left  Pain/Dysfunction with Movement    AROM PROM MMT AROM PROM MMT    Flexion (L2,120 deg) WFL WFL 4/5 WFL WFL 4/5    Extension (20 deg) WFL WFL 3+5* WFL WFL 3+/5*    Abduction (L5, 45 deg) WFL WFL 4/5 WFL WFL 4-/5*    Adduction (30 deg)  WFL WFL 5/5 WFL WFL 5/5    IR (30 deg) WFL WFL 5/5 WFL WFL 5/5    ER (45 deg) WFL WFL 4/5 WFL WFL 5/5       Knee  Right   Left  Pain/Dysfunction with Movement    AROM PROM MMT AROM PROM MMT    Flexion (S2, 140 deg) WFL WFL 5/5 WFL WFL 5/5    Extension (L3, 0-5 deg) WFL WFL 5/5 WFL WFL 5/5      Ankle  Right   Left  Pain/Dysfunction with Movement    AROM PROM MMT AROM PROM MMT    Dorsiflexion (L4, 20 deg) WFL WFL 5/5 WFL WFL 5/5    Plantarflexion (S1, 50 deg) WFL WFL 5/5 WFL WFL 5/5    Inversion (20-30 deg) WFL WFL NT WFL WFL NT    Eversion (5-10 deg) WFL WFL NT WFL WFL NT    Great toe extension (L5, 70 deg) WFL WFL NT WFL WFL NT      Lumbar Tests:  Slump test = negative B  Quadrant test = ipsilateral pain B with testing  SLR Test (L4-S3) = negative B  Lumbar distraction = negative   SL Bridge Test (glut med strength) = not tested  Modified slump test in S/L (femoral nerve) = not tested  Ely's test (L2,L3,L4) = negative  Prone Instability Test = not tested  Sign of the buttock (very poor SLR, PROM hip flex with knee bent, and non-capsular pattern) = negative    CMS Impairment/Limitation/Restriction for FOTO Lumbar Spine Survey    Therapist reviewed FOTO scores for Ryanne Damian on 7/24/2020.   FOTO documents entered into [employer] - see Media section.    Limitation Score: 80%  Category: Mobility  Predicted: 56%     TREATMENT   No treatment today.    Home Exercises and Patient Education Provided:  Education provided:   - abdominal bracing, lifting and carrying body mechanics, avoid activities that increase concordant pain  - course of therapy, prognosis    Written Home Exercises Provided: not today.  Exercises were reviewed and Ryanne was able to demonstrate them prior to the end of the session.  Ryanne demonstrated good  understanding of the education provided.     See EMR under Media for exercises provided not today.    Assessment   Ryanne ROTH is a 40 y.o. female referred to outpatient Physical Therapy [facility] with a medical diagnosis of Chronic bilateral low back pain without sciatica. Pt currently presents with B low back pain, decreased lumbar ROM, decreased left hip ROM, decreased BLE and core/lumbar strength, impaired posture, and functional deficits with prolonged standing and walking activities. Pt would benefit from skilled PT consisting of gait training, muscular skeletal stretching/strengthening, manual therapy, neuro muscular re-education, and modalities prn to address limitations and increase functional mobility.    Pt prognosis is Excellent.   Pt will benefit from skilled outpatient Physical Therapy to address the deficits stated above and in the chart below, provide pt/family education, and to maximize pt's level of independence.     Plan of care discussed with patient: Yes  Pt's spiritual, cultural and educational needs considered and patient is agreeable to the plan of care and goals as stated below:     Anticipated Barriers for therapy: chronicity of pain    Medical Necessity is demonstrated by the following  History  Co-morbidities and personal factors that may impact the plan of care Co-morbidities:   ADHD, depression    Personal Factors:   no deficits     moderate   Examination  Body Structures and Functions, activity limitations and participation restrictions that may impact the plan of care Body Regions:   back  lower extremities  trunk    Body Systems:    gross symmetry  ROM  strength  gross coordinated movement  balance  gait  transfers  transitions  motor control    Participation Restrictions:   None    Activity limitations:   Learning and applying knowledge  no deficits    General Tasks and Commands  no deficits    Communication  no deficits    Mobility  lifting and carrying objects  walking    Self care  no deficits    Domestic Life  doing house work (cleaning house, washing dishes, laundry)    Interactions/Relationships  no deficits    Life Areas  no deficits    Community and Social  Life  no deficits         high   Clinical Presentation stable and uncomplicated low   Decision Making/ Complexity Score: low     GOALS: Short Term Goals: 4 weeks  1. Pt will demo good TA muscle contraction for improved deep abdominal strength and lumbar stability.  2. Increase lumbar ROM to 100% of WNL in order to improve functional mobility.    3. Pt will demo good sitting/standing posture and body mechanics for improved spine health and decreased risk of future injury.  4. Pt to tolerate HEP to improve ROM and independence with ADL's.    Long Term Goals: 8 weeks  1. Report decreased low back pain without radiculopathy to </= 1/10 with activities and standing longer than a hour to increase tolerance for ADLs and increased QoL.  2. Increase strength to >/= 4/5 MMT grade for core and BLE to increase tolerance for ADL and work activities.  3. Pt to demonstrate negative SLR and/or Slump Test in order to show improved core strength and decreased nerve/dural tension.  4. Patient's goal: for my back to feel better and to better my posture  5. Pt will report at </= 56% impaired on FOTO lumbar score for low back pain disability to demonstrate decrease in disability and improvement in back pain.    Plan   Plan of care Certification: 7/24/2020 to 9/18/2020.    Outpatient Physical Therapy 2 times weekly for 8 weeks to include the following interventions: Aquatic Therapy, Cervical/Lumbar Traction, Electrical Stimulation, Gait Training, Manual Therapy, Moist Heat/ Ice, Neuromuscular Re-ed, Orthotic Management and Training, Patient Education, Self Care, Therapeutic Activites and Therapeutic Exercise.     Yair Manzo, PT

## 2020-07-29 PROBLEM — M54.50 CHRONIC BILATERAL LOW BACK PAIN WITHOUT SCIATICA: Status: ACTIVE | Noted: 2020-07-29

## 2020-07-29 PROBLEM — G89.29 CHRONIC BILATERAL LOW BACK PAIN WITHOUT SCIATICA: Status: ACTIVE | Noted: 2020-07-29

## 2020-07-29 PROBLEM — R53.1 DECREASED STRENGTH: Status: ACTIVE | Noted: 2020-07-29

## 2020-07-29 PROBLEM — M53.86 DECREASED ROM OF LUMBAR SPINE: Status: ACTIVE | Noted: 2020-07-29

## 2020-08-03 ENCOUNTER — TELEPHONE (OUTPATIENT)
Dept: FAMILY MEDICINE | Facility: HOSPITAL | Age: 40
End: 2020-08-03

## 2020-08-03 NOTE — TELEPHONE ENCOUNTER
----- Message from Perri Shah sent at 8/3/2020  1:50 PM CDT -----  Pt needs to know if  can put in an order for her to get a Covid  test. She was exposed to it. Please call pt back at [phone] when order has been put in

## 2020-08-04 NOTE — TELEPHONE ENCOUNTER
[facility]    [address]    [facility] [address]    Phone:  [phone]    Fax:  [phone]                                       Mariusz Griffiths   MRN: [id number]    Department:  [facility]   Date of Visit:  10/15/2017           After Visit Summary Signature Page     I have received my discharge instructions, and my questions have been answered. I have discussed any challenges I see with this plan with the nurse or doctor.    ..........................................................................................................................................  Patient/Patient Representative Signature      ..........................................................................................................................................  Patient Representative Print Name and Relationship to Patient    ..................................................               ................................................  Date                                            Time    ..........................................................................................................................................  Reviewed by Signature/Title    ...................................................              ..............................................  Date                                                            Time           Perri patient access rep. Has attempted to call patient twice to set up a virtual appt.no answer, no VM.

## 2020-08-05 ENCOUNTER — TELEPHONE (OUTPATIENT)
Dept: FAMILY MEDICINE | Facility: HOSPITAL | Age: 40
End: 2020-08-05

## 2020-08-05 NOTE — TELEPHONE ENCOUNTER
I called patient regarding covid testing. Patient denies any symptoms. Recommended patient to go to the [facility] for testing

## 2020-08-12 ENCOUNTER — TELEPHONE (OUTPATIENT)
Dept: REHABILITATION | Facility: HOSPITAL | Age: 40
End: 2020-08-12

## 2020-08-14 ENCOUNTER — TELEPHONE (OUTPATIENT)
Dept: FAMILY MEDICINE | Facility: HOSPITAL | Age: 40
End: 2020-08-14

## 2020-08-14 NOTE — TELEPHONE ENCOUNTER
----- Message from Perri Shah sent at 8/14/2020 12:39 PM CDT -----  Pt has spoke to Dr about a referral to the dermatologist. Please send to DR. Schultz at [phone] thanks

## 2020-09-04 ENCOUNTER — CLINICAL SUPPORT (OUTPATIENT)
Dept: REHABILITATION | Facility: HOSPITAL | Age: 40
End: 2020-09-04
Payer: MEDICAID

## 2020-09-04 DIAGNOSIS — G89.29 CHRONIC BILATERAL LOW BACK PAIN WITHOUT SCIATICA: ICD-10-CM

## 2020-09-04 DIAGNOSIS — M54.50 CHRONIC BILATERAL LOW BACK PAIN WITHOUT SCIATICA: ICD-10-CM

## 2020-09-04 DIAGNOSIS — M53.86 DECREASED ROM OF LUMBAR SPINE: ICD-10-CM

## 2020-09-04 DIAGNOSIS — R53.1 DECREASED STRENGTH: ICD-10-CM

## 2020-09-04 PROCEDURE — 97110 THERAPEUTIC EXERCISES: CPT | Mod: PN

## 2020-09-04 NOTE — PATIENT INSTRUCTIONS
Posture - Sitting    Sit upright, head facing forward. Scoot your tailbone all the way to the back of your chair. Lean slightly forward and place a rolled up towel at your waist. Lean back so shoulder blades lightly touch the back of the chair. Keep shoulders relaxed, and avoid rounded back. Keep hips level with knees. Avoid crossing legs for long periods.     Standing Calf Stretch:    Start by standing in front of a wall or other sturdy object. Step forward with one foot and maintain your toes on both feet to be pointed straight forward. Keep the leg behind you with a straight knee during the stretch.     Lean forward towards the wall and support yourself with your arms as you allow your front knee to bend until a gentle stretch is felt along the back of your leg that is most behind you.     Move closer or further away from the wall to control the stretch of the back leg. Also you can adjust the bend of the front knee to control the stretch as well. Hold for 30 seconds, repeat 3 times.     Piriformis Stretch with IR/Adduction    Pull right knee across body to opposite shoulder. Hold slight stretch for 30 seconds. Repeat with involved leg.  Repeat 3 times each side per set. Do 1 sets per session. Do 2 sessions per day.      Hamstring Stretch (Seated)    Sit on a raised flat surface where you can prop your affected leg up on it such as a treatment table, couch or bed.      While keeping your knee straight to slightly bent, slowly lean forward and reach your hands towards your foot until a gentle stretch is felt along the back of your knee/thigh. Hold for 30 seconds and then return to starting position and repeat.        Straight Leg Raise     With right leg straight, other leg bent, raise straight leg until knees are even. Slowly lower. Roll on your side and repeat lifting top leg up.  Repeat 10 times each leg per set. Do 3 sets per session. Do 2 sessions per day.         Strengthening: Hip Adduction -  "Isometric    Sit back or lie down with ball or folded pillow between knees, and squeeze knees together. Hold 5 seconds.  Repeat 10 times per set. Do 3 sets per session. Do 2 sessions per day.      Lumbar Rotation    Feet on floor, slowly rock knees from side to side in small, pain-free range of motion. Allow lower back to rotate slightly, but keep shoulders flat on ground. Hold 5 seconds.  Repeat 10 times per set. Do 3 sets per session. Do 2 sessions per day.        Bridging    Flatten back against floor then slowly raise buttocks from floor, keeping stomach tight. Hold 3-5 seconds.  Repeat 10 times per set. Do 3 sets per session. Do 2 sessions per day.      Cat/Cow    Begin by being on your hands and knees. Next push up through your arms to round out your back. After this let your shoulders relax and sink in. Hold each position for 3-5" and repeat 15 to 20 times.     Seated Trunk Flexion    While seated roll the exercise ball as far forward as you can, hold for 20-30 seconds while you continue to breath. Return to start position. Repeat 10-15 times total.    OR    Start by standing with your feet together and your hands on the back of a chair or counter top for support.    Next, lean forward for a gentle stretch to your low back/hamstrings.    Return up to starting position and repeat.    Strengthening: Hip Abduction        While standing, raise your leg out to the side. Keep your knee straight and maintain your toes pointed forward the entire time.    Use your arms for support if needed for balance and safety.   Repeat 10 times per set. Do 3 sets per session. Do 2 sessions per day.       Strengthening: Hip Extension - Resisted        While standing, balance on one leg and move your other leg in a backward direction. Do not swing the leg. Perform smooth and controlled movements. Keep your trunk stable and without arching during the movement. Use your arms for support if needed for balance and safety.  Repeat 10 " "times per set. Do 3 sets per session. Do 2 sessions per day.    Heel Raise: Bilateral (Standing)        Rise on balls of feet.  Repeat 10 times per set. Do 3 sets per session. Do 2 sessions per day.         Mini Squats    Stand at countertop and hold on for safety. Bend both knees for a mini squat. Remember to keep chest up and to hold on for support. Repeat for 3 sets of 10: a total of 30 repetitions.         Step-Down / Step-Up        Stand on stair step or 4-6" inch stool. Slowly bend left leg, lowering other foot to floor. Return by straightening front leg.  Repeat 10 times per set. Do 3 sets per session. Do 2 sessions per day.       Copyright © I. All rights reserved.      "

## 2020-09-04 NOTE — PROGRESS NOTES
"                            Physical Therapy Daily Treatment Note     Name: Ryanne Damian  Clinic Number: [id number]    Therapy Diagnosis:   Encounter Diagnoses   Name Primary?    Decreased strength     Decreased ROM of lumbar spine     Chronic bilateral low back pain without sciatica      Physician: Janice Gomez MD    Visit Date: 9/4/2020    Physician Orders: PT Eval and Treat  Medical Diagnosis from Referral: M54.5,G89.29 (ICD-10-CM) - Chronic bilateral low back pain without sciatica  Evaluation Date: 7/24/2020  Authorization Period Expiration: 12/31/2020  Plan of Care Expiration: 9/18/2020  Visit # / Visits authorized: 2/50  FOTO: 2/5    Time In: 10:47 AM  Time Out: 11:32 AM  Total Billable Time: 45 minutes (3 TE)    Precautions: Standard and ADHD, depression    Subjective     Pt reports: that she has some moderate pain today, but it's better than it was 2 days ago. States the pain is off and on and usually brought about when the weather changes. Believes her pain is a result of receiving 2 epidurals.   She will be compliant with home exercise program.  Response to previous treatment: Evaluation last session  Functional change: Evaluation last session    Pain: 5-6/10  Location: Midline low back    Objective     Ryanne received therapeutic exercises to develop strength, endurance and flexibility for 45 minutes including:  Supine HS Stretch: 30"x2, B  Piriformis Stretch: 30"x2, B  SLR: 3x10, B  Bridges: 2x10  Sidelying Hip ABD: 3x10, B  Cat Cow: 1x15, 3" holds  Gastroc Fitter Stretch: 30"x3  Squats: 2x10  Standing Hip ABD: 2x10, B  Seated Trunk Flexion w/ large red theraball: 10"x10        Home Exercises Provided and Patient Education Provided     Education provided:   - Proper body mechanics with performance of HEP  - Importance of performing HEP regularly in order to maximize therapy benefits    Written Home Exercises Provided: yes.  Exercises were reviewed and Remy able to demonstrate them prior " to the end of the session.  Ryanne demonstrated good  understanding of the education provided.     See EMR under Patient Instructions for exercises provided 9/4/2020.      Assessment     Patient presented to therapy with complaints of moderate low back pain. Able to tolerate implementation of all exercises with no increases in low back pain reported at end of session. Responded well to all stretches, specifically seated trunk flexion with theraball to stretch low back. Cueing throughout session for proper body mechanics for all exercises. Instructed to begin performing HEP daily.     Ryanne is progressing well towards her goals.   Pt prognosis is Excellent.     Pt will continue to benefit from skilled outpatient physical therapy to address the deficits listed in the problem list box on initial evaluation, provide pt/family education and to maximize pt's level of independence in the home and community environment.     Pt's spiritual, cultural and educational needs considered and pt agreeable to plan of care and goals.    Anticipated barriers to physical therapy: Chronicity of pain    Goals:   Short Term Goals: 4 weeks  1. Pt will demo good TA muscle contraction for improved deep abdominal strength and lumbar stability. Progressing, Not Met  2. Increase lumbar ROM to 100% of WNL in order to improve functional mobility.  Progressing, Not Met  3. Pt will demo good sitting/standing posture and body mechanics for improved spine health and decreased risk of future injury.Progressing, Not Met  4. Pt to tolerate HEP to improve ROM and independence with ADL's.Progressing, Not Met     Long Term Goals: 8 weeks  1. Report decreased low back pain without radiculopathy to </= 1/10 with activities and standing longer than a hour to increase tolerance for ADLs and increased QoL.Progressing, Not Met  2. Increase strength to >/= 4/5 MMT grade for core and BLE to increase tolerance for ADL and work activities.Progressing, Not Met  3.  Pt to demonstrate negative SLR and/or Slump Test in order to show improved core strength and decreased nerve/dural tension.Progressing, Not Met  4. Patient's goal: for my back to feel better and to better my posture. Progressing, Not Met  5. Pt will report at </= 56% impaired on FOTO lumbar score for low back pain disability to demonstrate decrease in disability and improvement in back pain. Progressing, Not Met       Plan     Continue POC, advancing towards functional goals.     Jessi Wong, PT, DPT

## 2020-09-17 ENCOUNTER — TELEPHONE (OUTPATIENT)
Dept: FAMILY MEDICINE | Facility: HOSPITAL | Age: 40
End: 2020-09-17

## 2020-09-17 NOTE — TELEPHONE ENCOUNTER
----- Message from Perri Shah sent at 9/16/2020  2:02 PM CDT -----  Pt needs Dr to send in her dermatologist referral to the location in [place], [place]. Fax number is [phone]

## 2021-01-04 ENCOUNTER — CLINICAL SUPPORT (OUTPATIENT)
Dept: URGENT CARE | Facility: CLINIC | Age: 41
End: 2021-01-04
Payer: MEDICAID

## 2021-01-04 DIAGNOSIS — Z20.822 COVID-19 RULED OUT: Primary | ICD-10-CM

## 2021-01-04 LAB
CTP QC/QA: YES
SARS-COV-2 RDRP RESP QL NAA+PROBE: NEGATIVE

## 2021-01-04 PROCEDURE — 87635 SARS-COV-2 COVID-19 AMP PRB: CPT | Mod: QW,S$GLB,, | Performed by: PHYSICIAN ASSISTANT

## 2021-01-04 PROCEDURE — 87635: ICD-10-PCS | Mod: QW,S$GLB,, | Performed by: PHYSICIAN ASSISTANT

## 2021-09-13 ENCOUNTER — TELEPHONE (OUTPATIENT)
Dept: FAMILY MEDICINE | Facility: CLINIC | Age: 41
End: 2021-09-13

## 2022-09-01 DIAGNOSIS — M54.42 LUMBAGO WITH SCIATICA, LEFT SIDE: Primary | ICD-10-CM

## 2022-11-16 ENCOUNTER — HOSPITAL ENCOUNTER (OUTPATIENT)
Dept: RADIOLOGY | Facility: HOSPITAL | Age: 42
Discharge: HOME | End: 2022-11-16
Attending: NURSE PRACTITIONER
Payer: MEDICAID

## 2022-11-16 DIAGNOSIS — M54.42 LUMBAGO WITH SCIATICA, LEFT SIDE: ICD-10-CM

## 2022-11-16 PROCEDURE — 72114 X-RAY EXAM L-S SPINE BENDING: CPT | Mod: TC,PO

## 2024-01-10 DIAGNOSIS — Z12.31 ENCOUNTER FOR SCREENING MAMMOGRAM FOR MALIGNANT NEOPLASM OF BREAST: Primary | ICD-10-CM
